# Patient Record
Sex: MALE | URBAN - METROPOLITAN AREA
[De-identification: names, ages, dates, MRNs, and addresses within clinical notes are randomized per-mention and may not be internally consistent; named-entity substitution may affect disease eponyms.]

---

## 2018-10-03 ENCOUNTER — TRANSFERRED RECORDS (OUTPATIENT)
Dept: HEALTH INFORMATION MANAGEMENT | Facility: CLINIC | Age: 73
End: 2018-10-03

## 2018-10-22 NOTE — TELEPHONE ENCOUNTER
FUTURE VISIT INFORMATION      FUTURE VISIT INFORMATION:    Date: 10/30/18    Time: 0900    Location: [facility]  REFERRAL INFORMATION:    Referring provider:  N/A    Referring providers clinic:  N/A    Reason for visit/diagnosis  LUMBAR AND SACRAL SPONDY    RECORDS REQUESTED FROM:       Clinic name Comments Records Status Imaging Status   [facility] FAXED REQUEST FOR RECORDS AND IMAGES 10/22/18@1330     [facility] FAXED REQUEST FOR RECORDS AND IMAGES 10/22/18@1330, MRI WAS TO BE DONE ON 10/23/18. PT NO SHOWED WILL CALL ON 10/26/18 TO FOLLOW UP                                RECORDS STATUS      RECORDS RECEIVED FROM: [facility]AJ[facility] CLARK   DATE RECEIVED: 10/23/18   NOTES STATUS DETAILS   OFFICE NOTE from referring provider N/A    OFFICE NOTE from other specialist N/A    DISCHARGE SUMMARY from hospital N/A    DISCHARGE REPORT from the ER N/A    OPERATIVE REPORT N/A    MEDICATION LIST N/A    IMPLANT RECORD/STICKER N/A    LABS     CBC/DIFF N/A    CULTURES N/A    INJECTIONS DONE IN RADIOLOGY N/A    MRI Received 5/11/15   CT SCAN N/A    XRAYS (IMAGES & REPORTS) N/A    TUMOR     PATHOLOGY  Slides & report N/A       RECORDS RECEIVED FROM: [facility]   DATE RECEIVED: 10/23/18   NOTES STATUS DETAILS   OFFICE NOTE from referring provider N/A    OFFICE NOTE from other specialist Received 9/17/18*10/3/18*   DISCHARGE SUMMARY from hospital N/A    DISCHARGE REPORT from the ER N/A    OPERATIVE REPORT N/A    MEDICATION LIST N/A    IMPLANT RECORD/STICKER N/A    LABS     CBC/DIFF N/A    CULTURES N/A    INJECTIONS DONE IN RADIOLOGY N/A    MRI N/A    CT SCAN N/A    XRAYS (IMAGES & REPORTS) N/A    TUMOR     PATHOLOGY  Slides & report N/A

## 2018-10-25 DIAGNOSIS — M43.16 SPONDYLOLISTHESIS OF LUMBAR REGION: Primary | ICD-10-CM

## 2018-10-26 NOTE — TELEPHONE ENCOUNTER
Action    Action Taken CALLED [facility] HAS NOT BEEN COMPLETED AND IS NOT SCHEDULED. NOTIFIED [facility] 10/26/18@0987

## 2018-10-30 ENCOUNTER — PRE VISIT (OUTPATIENT)
Dept: ORTHOPEDICS | Facility: CLINIC | Age: 73
End: 2018-10-30